# Patient Record
Sex: MALE | Race: WHITE | Employment: STUDENT | URBAN - METROPOLITAN AREA
[De-identification: names, ages, dates, MRNs, and addresses within clinical notes are randomized per-mention and may not be internally consistent; named-entity substitution may affect disease eponyms.]

---

## 2017-04-05 ENCOUNTER — TELEPHONE (OUTPATIENT)
Dept: FAMILY MEDICINE CLINIC | Facility: CLINIC | Age: 22
End: 2017-04-05

## 2017-04-05 NOTE — TELEPHONE ENCOUNTER
Pt is traveling  Out of the country and is asking what shots you would recommend , Pt is leaving May 4th .

## 2017-04-06 NOTE — TELEPHONE ENCOUNTER
JWL pls confirm immunizations up to date? tdap from 06/2007. Vaccine still good or would you like booster before he leaves? LMTCB.  JOHN when pt calls back pls inform him to contact either [address] [place] [address] travel [phone] or [phone]

## 2024-03-31 ENCOUNTER — HOSPITAL ENCOUNTER (EMERGENCY)
Facility: HOSPITAL | Age: 29
Discharge: HOME | End: 2024-03-31
Attending: EMERGENCY MEDICINE
Payer: COMMERCIAL

## 2024-03-31 VITALS
OXYGEN SATURATION: 99 % | TEMPERATURE: 98 F | WEIGHT: 170 LBS | HEART RATE: 69 BPM | SYSTOLIC BLOOD PRESSURE: 127 MMHG | BODY MASS INDEX: 24.34 KG/M2 | DIASTOLIC BLOOD PRESSURE: 95 MMHG | HEIGHT: 70 IN | RESPIRATION RATE: 18 BRPM

## 2024-03-31 DIAGNOSIS — A69.20 ERYTHEMA MIGRANS (LYME DISEASE): Primary | ICD-10-CM

## 2024-03-31 PROCEDURE — 99283 EMERGENCY DEPT VISIT LOW MDM: CPT

## 2024-03-31 RX ORDER — DOXYCYCLINE HYCLATE 100 MG/1
100 CAPSULE ORAL 2 TIMES DAILY
Qty: 28 CAPSULE | Refills: 0 | Status: SHIPPED | OUTPATIENT
Start: 2024-03-31 | End: 2024-04-14

## 2024-03-31 NOTE — ED INITIAL ASSESSMENT (HPI)
Pt presents to the ER with c/o bug bite to under chin on 2/26 while hiking in [place]. Believes it could have been a tick

## 2024-03-31 NOTE — ED QUICK NOTES
Pt independently walking. Family at bedside. DC paperwork reviewed with pt; all questions answered. Pt stable at DC.

## 2024-03-31 NOTE — ED QUICK NOTES
Pt states he went camping in [place] March 26th and shaved his kirk today. Pt reports \"I felt something and picked it out\". He says it looked like it could've been a tick. Pink ring around red site is noted. Afebrile and no flu like symptoms.

## 2024-04-01 NOTE — ED PROVIDER NOTES
Patient Seen in: [facility] Emergency Department    History     Chief Complaint   Patient presents with    Bite       HPI    The patient presents to the ED after being bitten by a tick to his chin on 2/26 while hiking in [place].  He states he pulled the tick off but then dropped it.  He shaved his kirk today after coming home and noted a red ring around the bite area.  Now presents to the ED for evaluation.  Denies other complaints.    History reviewed.   Past Medical History:   Diagnosis Date    VARICELLA [birth year]96       History reviewed. History reviewed. No pertinent surgical history.      Medications :  (Not in a hospital admission)       Family History   Problem Relation Age of Onset    Diabetes Other         Paternal side, adult and juvenile    Heart Disease Maternal Grandfather     Asthma Other         m aunt    High Blood Pressure Other         m aunt    Cancer Other         mggf, colon    Melanoma Other         Family h/o       Smoking Status:   Social History     Socioeconomic History    Marital status: Single   Tobacco Use    Smoking status: Never     Passive exposure: Never   Vaping Use    Vaping Use: Never used   Substance and Sexual Activity    Alcohol use: Yes     Alcohol/week: 0.0 standard drinks of alcohol     Comment: Occasionally.    Drug use: No   Other Topics Concern    Caffeine Concern Yes     Comment: Coffee, 2 x week       Constitutional and vital signs reviewed.      Social History and Family History elements reviewed from today, pertinent positives to the presenting problem noted.    Physical Exam     ED Triage Vitals [03/31/24 1450]   /81   Pulse 70   Resp 18   Temp 98.2 °F (36.8 °C)   Temp src Oral   SpO2 100 %   O2 Device None (Room air)       All measures to prevent infection transmission during my interaction with the patient were taken. Handwashing was performed prior to and after the exam.  Stethoscope and any equipment used during my examination was cleaned with  super sani-cloth germicidal wipes following the exam.     Physical Exam  Constitutional:       Appearance: Normal appearance.   Pulmonary:      Effort: Pulmonary effort is normal. No respiratory distress.   Skin:     General: Skin is warm.      Findings: Rash present.      Comments: Small red nodule to the inferior chin with a erythematous bull's-eye rash measuring 4 cm diameter surrounding this.   Neurological:      Mental Status: He is alert. Mental status is at baseline.   Psychiatric:         Mood and Affect: Mood normal.         Behavior: Behavior normal.         ED Course      Labs Reviewed - No data to display    As Interpreted by me    Imaging Results Available and Reviewed while in ED: No results found.  ED Medications Administered: Medications - No data to display      MDM     Vitals:    03/31/24 1450 03/31/24 1456 03/31/24 1500   BP: 138/81 (!) 127/95 (!) 127/95   Pulse: 70 73 69   Resp: 18 18 18   Temp: 98.2 °F (36.8 °C)     TempSrc: Oral     SpO2: 100% 99% 99%   Weight: 77.1 kg     Height: 177.8 cm (5' 10\")       *I personally reviewed and interpreted all ED vitals.    Puls  Differential Diagnosis/ Diagnostic Considerations: Erythema migrans, tick bite, other    Complicating Factors: The patient already has does not have any pertinent problems on file. to contribute to the complexity of this ED evaluation.    Medical Decision Making  The patient presents to the ED with a likely tick bite to his chin and now symptoms concerning for erythema migrans.  Will discharge home with doxycycline and recommended outpatient follow-up.    Problems Addressed:  Erythema migrans (Lyme disease): acute illness or injury    Risk  Prescription drug management.        Condition upon leaving the department: Stable    Disposition and Plan     Clinical Impression:  1. Erythema migrans (Lyme disease)        Disposition:  Discharge    Follow-up:  Alfredito Sunshine MD  [address] [facility]  [address] [place] [address]  [id number]  [phone]    Follow up  As needed      Medications Prescribed:  Discharge Medication List as of 3/31/2024  3:45 PM        START taking these medications    Details   doxycycline 100 MG Oral Cap Take 1 capsule (100 mg total) by mouth 2 (two) times daily for 14 days., Normal, Disp-28 capsule, R-0